# Patient Record
Sex: MALE | Race: WHITE | NOT HISPANIC OR LATINO | Employment: FULL TIME | RURAL
[De-identification: names, ages, dates, MRNs, and addresses within clinical notes are randomized per-mention and may not be internally consistent; named-entity substitution may affect disease eponyms.]

---

## 2018-12-07 ENCOUNTER — TELEPHONE (OUTPATIENT)
Dept: FAMILY MEDICINE CLINIC | Facility: CLINIC | Age: 43
End: 2018-12-07

## 2023-02-08 ENCOUNTER — TELEPHONE (OUTPATIENT)
Dept: NEUROSURGERY | Facility: CLINIC | Age: 48
End: 2023-02-08
Payer: OTHER MISCELLANEOUS

## 2023-02-08 NOTE — TELEPHONE ENCOUNTER
Called patient to remind him to bring his imaging CD and to make sure he rec'd his new patient paperwork.  He didn't answer so I left a VM.  I left my direct # in case he needed to get back to me with any questions.    JONATHAN MALDONADO [facility]  PHYSICIAN LEAD  DR JUSTINE MISTRY  [facility] [place] [facility]      IT IS OKAY FOR THE HUB TO DELIVER THIS INFORMATION TO THE PATIENT IF THEY RECEIVE THIS CALL BACK

## 2023-03-03 ENCOUNTER — OFFICE VISIT (OUTPATIENT)
Dept: NEUROSURGERY | Facility: CLINIC | Age: 48
End: 2023-03-03
Payer: OTHER MISCELLANEOUS

## 2023-03-03 VITALS — HEIGHT: 69 IN | BODY MASS INDEX: 28.29 KG/M2 | WEIGHT: 191 LBS

## 2023-03-03 DIAGNOSIS — G89.29 CHRONIC MIDLINE LOW BACK PAIN WITH BILATERAL SCIATICA: ICD-10-CM

## 2023-03-03 DIAGNOSIS — E66.3 OVERWEIGHT WITH BODY MASS INDEX (BMI) OF 28 TO 28.9 IN ADULT: ICD-10-CM

## 2023-03-03 DIAGNOSIS — R20.2 NUMBNESS AND TINGLING IN BOTH HANDS: ICD-10-CM

## 2023-03-03 DIAGNOSIS — M50.30 DEGENERATION OF CERVICAL INTERVERTEBRAL DISC: ICD-10-CM

## 2023-03-03 DIAGNOSIS — F17.200 CURRENT EVERY DAY SMOKER: ICD-10-CM

## 2023-03-03 DIAGNOSIS — M54.42 CHRONIC MIDLINE LOW BACK PAIN WITH BILATERAL SCIATICA: ICD-10-CM

## 2023-03-03 DIAGNOSIS — R20.0 NUMBNESS AND TINGLING IN BOTH HANDS: ICD-10-CM

## 2023-03-03 DIAGNOSIS — M54.6 PAIN IN THORACIC SPINE: ICD-10-CM

## 2023-03-03 DIAGNOSIS — M79.18 MYOFASCIAL PAIN SYNDROME: Primary | ICD-10-CM

## 2023-03-03 DIAGNOSIS — M54.41 CHRONIC MIDLINE LOW BACK PAIN WITH BILATERAL SCIATICA: ICD-10-CM

## 2023-03-03 DIAGNOSIS — M54.2 CERVICALGIA: ICD-10-CM

## 2023-03-03 PROBLEM — M54.50 CHRONIC MIDLINE LOW BACK PAIN: Status: ACTIVE | Noted: 2023-03-03

## 2023-03-03 PROCEDURE — 99204 OFFICE O/P NEW MOD 45 MIN: CPT | Performed by: NEUROLOGICAL SURGERY

## 2023-03-03 RX ORDER — MECLIZINE HYDROCHLORIDE 25 MG/1
25 TABLET ORAL EVERY 6 HOURS PRN
COMMUNITY

## 2023-03-03 RX ORDER — PAROXETINE 10 MG/1
TABLET, FILM COATED ORAL
COMMUNITY
Start: 2023-02-09

## 2023-03-03 RX ORDER — CLOTRIMAZOLE AND BETAMETHASONE DIPROPIONATE 10; .64 MG/G; MG/G
1 CREAM TOPICAL
COMMUNITY

## 2023-03-03 RX ORDER — ONDANSETRON 4 MG/1
4 TABLET, FILM COATED ORAL EVERY 8 HOURS PRN
COMMUNITY

## 2023-03-03 RX ORDER — TRAMADOL HYDROCHLORIDE 50 MG/1
TABLET ORAL
COMMUNITY
Start: 2023-02-03 | End: 2023-06-03

## 2023-03-03 RX ORDER — DULOXETIN HYDROCHLORIDE 30 MG/1
1 CAPSULE, DELAYED RELEASE ORAL DAILY
COMMUNITY
Start: 2023-02-21

## 2023-03-03 RX ORDER — LORATADINE 10 MG/1
10 CAPSULE, LIQUID FILLED ORAL
COMMUNITY

## 2023-03-03 RX ORDER — CYCLOBENZAPRINE HCL 10 MG
10 TABLET ORAL
COMMUNITY

## 2023-03-03 RX ORDER — CLONAZEPAM 0.5 MG/1
0.5 TABLET ORAL
COMMUNITY

## 2023-03-03 RX ORDER — BUPROPION HYDROCHLORIDE 150 MG/1
TABLET, EXTENDED RELEASE ORAL EVERY 12 HOURS SCHEDULED
COMMUNITY
Start: 2023-03-01

## 2023-03-03 RX ORDER — NAPROXEN 500 MG/1
TABLET ORAL
COMMUNITY
Start: 2023-03-02

## 2023-03-03 RX ORDER — TRIAMCINOLONE ACETONIDE 1 MG/G
1 CREAM TOPICAL
COMMUNITY
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
## 2023-03-03 NOTE — PATIENT INSTRUCTIONS
"PATIENT TO CONTINUE TO FOLLOW UP WITH HIS PRIMARY CARE PROVIDER FOR YEARLY PHYSICAL EXAMS TO ENSURE COMPLETE HEALTH MAINTENANCE      BMI for Adults  What is BMI?  Body mass index (BMI) is a number that is calculated from a person's weight and height. BMI can help estimate how much of a person's weight is composed of fat. BMI does not measure body fat directly. Rather, it is an alternative to procedures that directly measure body fat, which can be difficult and expensive.  BMI can help identify people who may be at higher risk for certain medical problems.  What are BMI measurements used for?  BMI is used as a screening tool to identify possible weight problems. It helps determine whether a person is obese, overweight, a healthy weight, or underweight.  BMI is useful for:  Identifying a weight problem that may be related to a medical condition or may increase the risk for medical problems.  Promoting changes, such as changes in diet and exercise, to help reach a healthy weight. BMI screening can be repeated to see if these changes are working.  How is BMI calculated?  BMI involves measuring your weight in relation to your height. Both height and weight are measured, and the BMI is calculated from those numbers. This can be done either in English ([place] or metric measurements. Note that charts and online BMI calculators are available to help you find your BMI quickly and easily without having to do these calculations yourself.  To calculate your BMI in English ([place] measurements:    Measure your weight in pounds (lb).  Multiply the number of pounds by 703.  For example, for a person who weighs 180 lb, multiply that number by 703, which equals 126,540.  Measure your height in inches. Then multiply that number by itself to get a measurement called \"inches squared.\"  For example, for a person who is 70 inches tall, the \"inches squared\" measurement is 70 inches x 70 inches, which equals 4,900 inches squared.  Divide the " "total from step 2 (number of lb x 703) by the total from step 3 (inches squared): 126,540 ÷ 4,900 = 25.8. This is your BMI.  To calculate your BMI in metric measurements:  Measure your weight in kilograms (kg).  Measure your height in meters (m). Then multiply that number by itself to get a measurement called \"meters squared.\"  For example, for a person who is 1.75 m tall, the \"meters squared\" measurement is 1.75 m x 1.75 m, which is equal to 3.1 meters squared.  Divide the number of kilograms (your weight) by the meters squared number. In this example: 70 ÷ 3.1 = 22.6. This is your BMI.  What do the results mean?  BMI charts are used to identify whether you are underweight, normal weight, overweight, or obese. The following guidelines will be used:  Underweight: BMI less than 18.5.  Normal weight: BMI between 18.5 and 24.9.  Overweight: BMI between 25 and 29.9.  Obese: BMI of 30 or above.  Keep these notes in mind:  Weight includes both fat and muscle, so someone with a muscular build, such as an athlete, may have a BMI that is higher than 24.9. In cases like these, BMI is not an accurate measure of body fat.  To determine if excess body fat is the cause of a BMI of 25 or higher, further assessments may need to be done by a health care provider.  BMI is usually interpreted in the same way for men and women.  Where to find more information  For more information about BMI, including tools to quickly calculate your BMI, go to these websites:  Centers for Disease Control and Prevention: www.cdc.gov  American Heart Association: www.heart.org  National Heart, Lung, and Blood [place]: www.nhlbi.nih.gov  Summary  Body mass index (BMI) is a number that is calculated from a person's weight and height.  BMI may help estimate how much of a person's weight is composed of fat. BMI can help identify those who may be at higher risk for certain medical problems.  BMI can be measured using English measurements or metric " "measurements.  BMI charts are used to identify whether you are underweight, normal weight, overweight, or obese.  This information is not intended to replace advice given to you by your health care provider. Make sure you discuss any questions you have with your health care provider.  Document Revised: 09/09/2020 Document Reviewed: 07/17/2020  [employer] Patient Education © 2022 [employer]  DASH Eating Plan  DASH stands for Dietary Approaches to Stop Hypertension. The DASH eating plan is a healthy eating plan that has been shown to:  Reduce high blood pressure (hypertension).  Reduce your risk for type 2 diabetes, heart disease, and stroke.  Help with weight loss.  What are tips for following this plan?  Reading food labels  Check food labels for the amount of salt (sodium) per serving. Choose foods with less than 5 percent of the Daily Value of sodium. Generally, foods with less than 300 milligrams (mg) of sodium per serving fit into this eating plan.  To find whole grains, look for the word \"whole\" as the first word in the ingredient list.  Shopping  Buy products labeled as \"low-sodium\" or \"no salt added.\"  Buy fresh foods. Avoid canned foods and pre-made or frozen meals.  Cooking  Avoid adding salt when cooking. Use salt-free seasonings or herbs instead of table salt or sea salt. Check with your health care provider or pharmacist before using salt substitutes.  Do not ba foods. Cook foods using healthy methods such as baking, boiling, grilling, roasting, and broiling instead.  Cook with heart-healthy oils, such as olive, canola, avocado, soybean, or sunflower oil.  Meal planning    Eat a balanced diet that includes:  4 or more servings of fruits and 4 or more servings of vegetables each day. Try to fill one-half of your plate with fruits and vegetables.  6-8 servings of whole grains each day.  Less than 6 oz (170 g) of lean meat, poultry, or fish each day. A 3-oz (85-g) serving of meat is about the same size as " a deck of cards. One egg equals 1 oz (28 g).  2-3 servings of low-fat dairy each day. One serving is 1 cup (237 mL).  1 serving of nuts, seeds, or beans 5 times each week.  2-3 servings of heart-healthy fats. Healthy fats called omega-3 fatty acids are found in foods such as walnuts, flaxseeds, fortified milks, and eggs. These fats are also found in cold-water fish, such as sardines, salmon, and mackerel.  Limit how much you eat of:  Canned or prepackaged foods.  Food that is high in trans fat, such as some fried foods.  Food that is high in saturated fat, such as fatty meat.  Desserts and other sweets, sugary drinks, and other foods with added sugar.  Full-fat dairy products.  Do not salt foods before eating.  Do not eat more than 4 egg yolks a week.  Try to eat at least 2 vegetarian meals a week.  Eat more home-cooked food and less restaurant, buffet, and fast food.  Lifestyle  When eating at a restaurant, ask that your food be prepared with less salt or no salt, if possible.  If you drink alcohol:  Limit how much you use to:  0-1 drink a day for women who are not pregnant.  0-2 drinks a day for men.  Be aware of how much alcohol is in your drink. In the [place] one drink equals one 12 oz bottle of beer (355 mL), one 5 oz glass of wine (148 mL), or one 1½ oz glass of hard liquor (44 mL).  General information  Avoid eating more than 2,300 mg of salt a day. If you have hypertension, you may need to reduce your sodium intake to 1,500 mg a day.  Work with your health care provider to maintain a healthy body weight or to lose weight. Ask what an ideal weight is for you.  Get at least 30 minutes of exercise that causes your heart to beat faster (aerobic exercise) most days of the week. Activities may include walking, swimming, or biking.  Work with your health care provider or dietitian to adjust your eating plan to your individual calorie needs.  What foods should I eat?  Fruits  All fresh, dried, or frozen fruit. Canned  fruit in natural juice (without added sugar).  Vegetables  Fresh or frozen vegetables (raw, steamed, roasted, or grilled). Low-sodium or reduced-sodium tomato and vegetable juice. Low-sodium or reduced-sodium tomato sauce and tomato paste. Low-sodium or reduced-sodium canned vegetables.  Grains  Whole-grain or whole-wheat bread. Whole-grain or whole-wheat pasta. Brown rice. Oatmeal. Quinoa. Bulgur. Whole-grain and low-sodium cereals. Annette bread. Low-fat, low-sodium crackers. Whole-wheat flour tortillas.  Meats and other proteins  Skinless chicken or turkey. Ground chicken or turkey. Pork with fat trimmed off. Fish and seafood. Egg whites. Dried beans, peas, or lentils. Unsalted nuts, nut butters, and seeds. Unsalted canned beans. Lean cuts of beef with fat trimmed off. Low-sodium, lean precooked or cured meat, such as sausages or meat loaves.  Dairy  Low-fat (1%) or fat-free (skim) milk. Reduced-fat, low-fat, or fat-free cheeses. Nonfat, low-sodium ricotta or cottage cheese. Low-fat or nonfat yogurt. Low-fat, low-sodium cheese.  Fats and oils  Soft margarine without trans fats. Vegetable oil. Reduced-fat, low-fat, or light mayonnaise and salad dressings (reduced-sodium). Canola, safflower, olive, avocado, soybean, and sunflower oils. Avocado.  Seasonings and condiments  Herbs. Spices. Seasoning mixes without salt.  Other foods  Unsalted popcorn and pretzels. Fat-free sweets.  The items listed above may not be a complete list of foods and beverages you can eat. Contact a dietitian for more information.  What foods should I avoid?  Fruits  Canned fruit in a light or heavy syrup. Fried fruit. Fruit in cream or butter sauce.  Vegetables  Creamed or fried vegetables. Vegetables in a cheese sauce. Regular canned vegetables (not low-sodium or reduced-sodium). Regular canned tomato sauce and paste (not low-sodium or reduced-sodium). Regular tomato and vegetable juice (not low-sodium or reduced-sodium). Pickles.  Olives.  Grains  Baked goods made with fat, such as croissants, muffins, or some breads. Dry pasta or rice meal packs.  Meats and other proteins  Fatty cuts of meat. Ribs. Fried meat. Riley. Bologna, salami, and other precooked or cured meats, such as sausages or meat loaves. Fat from the back of a pig (fatback). Bratwurst. Salted nuts and seeds. Canned beans with added salt. Canned or smoked fish. Whole eggs or egg yolks. Chicken or turkey with skin.  Dairy  Whole or 2% milk, cream, and half-and-half. Whole or full-fat cream cheese. Whole-fat or sweetened yogurt. Full-fat cheese. Nondairy creamers. Whipped toppings. Processed cheese and cheese spreads.  Fats and oils  Butter. Stick margarine. Lard. Shortening. Ghee. Riley fat. Tropical oils, such as coconut, palm kernel, or palm oil.  Seasonings and condiments  Onion salt, garlic salt, seasoned salt, table salt, and sea salt. [facility] Hospital[facility] sauce. Tartar sauce. Barbecue sauce. Teriyaki sauce. Soy sauce, including reduced-sodium. Steak sauce. Canned and packaged gravies. Fish sauce. Oyster sauce. Cocktail sauce. Store-bought horseradish. Ketchup. Mustard. Meat flavorings and tenderizers. Bouillon cubes. Hot sauces. Pre-made or packaged marinades. Pre-made or packaged taco seasonings. Relishes. Regular salad dressings.  Other foods  Salted popcorn and pretzels.  The items listed above may not be a complete list of foods and beverages you should avoid. Contact a dietitian for more information.  Where to find more information  National Heart, Lung, and Blood [place]: www.nhlbi.nih.gov  American Heart Association: www.heart.org  Academy of Nutrition and Dietetics: www.eatright.org  National Kidney Foundation: www.kidney.org  Summary  The DASH eating plan is a healthy eating plan that has been shown to reduce high blood pressure (hypertension). It may also reduce your risk for type 2 diabetes, heart disease, and stroke.  When on the DASH eating plan, aim to eat more  fresh fruits and vegetables, whole grains, lean proteins, low-fat dairy, and heart-healthy fats.  With the DASH eating plan, you should limit salt (sodium) intake to 2,300 mg a day. If you have hypertension, you may need to reduce your sodium intake to 1,500 mg a day.  Work with your health care provider or dietitian to adjust your eating plan to your individual calorie needs.  This information is not intended to replace advice given to you by your health care provider. Make sure you discuss any questions you have with your health care provider.  Document Revised: 11/20/2020 Document Reviewed: 11/20/2020  [employer] Patient Education © 2022 [employer]  Steps to Quit Smoking  Smoking tobacco is the leading cause of preventable death. It can affect almost every organ in the body. Smoking puts you and those around you at risk for developing many serious chronic diseases. Quitting smoking can be difficult, but it is one of the best things that you can do for your health. It is never too late to quit.  How do I get ready to quit?  When you decide to quit smoking, create a plan to help you succeed. Before you quit:  Pick a date to quit. Set a date within the next 2 weeks to give you time to prepare.  Write down the reasons why you are quitting. Keep this list in places where you will see it often.  Tell your family, friends, and co-workers that you are quitting. Support from your loved ones can make quitting easier.  Talk with your health care provider about your options for quitting smoking.  Find out what treatment options are covered by your health insurance.  Identify people, places, things, and activities that make you want to smoke (triggers). Avoid them.  What first steps can I take to quit smoking?  Throw away all cigarettes at home, at work, and in your car.  Throw away smoking accessories, such as ashtrays and lighters.  Clean your car. Make sure to empty the ashtray.  Clean your home, including curtains and  carpets.  What strategies can I use to quit smoking?  Talk with your health care provider about combining strategies, such as taking medicines while you are also receiving in-person counseling. Using these two strategies together makes you more likely to succeed in quitting than if you used either strategy on its own.  If you are pregnant or breastfeeding, talk with your health care provider about finding counseling or other support strategies to quit smoking. Do not take medicine to help you quit smoking unless your health care provider tells you to do so.  To quit smoking:  Quit right away  Quit smoking completely, instead of gradually reducing how much you smoke over a period of time. Research shows that stopping smoking right away is more successful than gradually quitting.  Attend in-person counseling to help you build problem-solving skills. You are more likely to succeed in quitting if you attend counseling sessions regularly. Even short sessions of 10 minutes can be effective.  Take medicine  You may take medicines to help you quit smoking. Some medicines require a prescription and some you can purchase over-the-counter. Medicines may have nicotine in them to replace the nicotine in cigarettes. Medicines may:  Help to stop cravings.  Help to relieve withdrawal symptoms.  Your health care provider may recommend:  Nicotine patches, gum, or lozenges.  Nicotine inhalers or sprays.  Non-nicotine medicine that is taken by mouth.  Find resources  Find resources and support systems that can help you to quit smoking and remain smoke-free after you quit. These resources are most helpful when you use them often. They include:  Online chats with a counselor.  Telephone quitlines.  Printed self-help materials.  Support groups or group counseling.  Text messaging programs.  Mobile phone apps or applications. Use apps that can help you stick to your quit plan by providing reminders, tips, and encouragement. There are many  free apps for mobile devices as well as websites. Examples include Quit Guide from the CDC and smokefree.gov  What things can I do to make it easier to quit?    Reach out to your family and friends for support and encouragement. Call telephone quitlines (9-895-QUIT-NOW), reach out to support groups, or work with a counselor for support.  Ask people who smoke to avoid smoking around you.  Avoid places that trigger you to smoke, such as bars, parties, or smoke-break areas at work.  Spend time with people who do not smoke.  Lessen the stress in your life. Stress can be a smoking trigger for some people. To lessen stress, try:  Exercising regularly.  Doing deep-breathing exercises.  Doing yoga.  Meditating.  Performing a body scan. This involves closing your eyes, scanning your body from head to toe, and noticing which parts of your body are particularly tense. Try to relax the muscles in those areas.  How will I feel when I quit smoking?  Day 1 to 3 weeks  Within the first 24 hours of quitting smoking, you may start to feel withdrawal symptoms. These symptoms are usually most noticeable 2-3 days after quitting, but they usually do not last for more than 2-3 weeks. You may experience these symptoms:  Mood swings.  Restlessness, anxiety, or irritability.  Trouble concentrating.  Dizziness.  Strong cravings for sugary foods and nicotine.  Mild weight gain.  Constipation.  Nausea.  Coughing or a sore throat.  Changes in how the medicines that you take for unrelated issues work in your body.  Depression.  Trouble sleeping (insomnia).  Week 3 and afterward  After the first 2-3 weeks of quitting, you may start to notice more positive results, such as:  Improved sense of smell and taste.  Decreased coughing and sore throat.  Slower heart rate.  Lower blood pressure.  Clearer skin.  The ability to breathe more easily.  Fewer sick days.  Quitting smoking can be very challenging. Do not get discouraged if you are not successful  the first time. Some people need to make many attempts to quit before they achieve long-term success. Do your best to stick to your quit plan, and talk with your health care provider if you have any questions or concerns.  Summary  Smoking tobacco is the leading cause of preventable death. Quitting smoking is one of the best things that you can do for your health.  When you decide to quit smoking, create a plan to help you succeed.  Quit smoking right away, not slowly over a period of time.  When you start quitting, seek help from your health care provider, family, or friends.  This information is not intended to replace advice given to you by your health care provider. Make sure you discuss any questions you have with your health care provider.  Document Revised: 08/26/2022 Document Reviewed: 03/07/2020  [employer] Patient Education © 2022 [employer]  Tobacco Use Disorder  Tobacco use disorder (TUD) occurs when a person craves, seeks, and uses tobacco, regardless of the consequences. This disorder can cause problems with mental and physical health. It can affect your ability to have healthy relationships, and it can keep you from meeting your responsibilities at work, home, or school.  Tobacco may be:  Smoked as a cigarette or cigar.  Inhaled using e-cigarettes.  Smoked in a pipe or hookah.  Chewed as smokeless tobacco.  Inhaled into the nostrils as snuff.  Tobacco products contain a dangerous chemical called nicotine, which is very addictive. Nicotine triggers hormones that make the body feel stimulated and works on areas of the brain that make you feel good. These effects can make it hard for people to quit nicotine.  Tobacco contains many other unsafe chemicals that can damage almost every organ in the body. Smoking tobacco also puts others in danger due to fire risk and possible health problems caused by breathing in secondhand smoke.  What are the signs or symptoms?  Symptoms of TUD may include:  Being unable  to slow down or stop your tobacco use.  Spending an abnormal amount of time getting or using tobacco.  Craving tobacco. Cravings may last for up to 6 months after quitting.  Tobacco use that:  Interferes with your work, school, or home life.  Interferes with your personal and social relationships.  Makes you give up activities that you once enjoyed or found important.  Using tobacco even though you know that it is:  Dangerous or bad for your health or someone else's health.  Causing problems in your life.  Needing more and more of the substance to get the same effect (developing tolerance).  Experiencing unpleasant symptoms if you do not use the substance (withdrawal). Withdrawal symptoms may include:  Depressed, anxious, or irritable mood.  Difficulty concentrating.  Increased appetite.  Restlessness or trouble sleeping.  Using the substance to avoid withdrawal.  How is this diagnosed?  This condition may be diagnosed based on:  Your current and past tobacco use. Your health care provider may ask questions about how your tobacco use affects your life.  A physical exam.  You may be diagnosed with TUD if you have at least two symptoms within a 12-month period.  How is this treated?  This condition is treated by stopping tobacco use. Many people are unable to quit on their own and need help. Treatment may include:  Nicotine replacement therapy (NRT). NRT provides nicotine without the other harmful chemicals in tobacco. NRT gradually lowers the dosage of nicotine in the body and reduces withdrawal symptoms. NRT is available as:  Over-the-counter gums, lozenges, and skin patches.  Prescription mouth inhalers and nasal sprays.  Medicine that acts on the brain to reduce cravings and withdrawal symptoms.  A type of talk therapy that examines your triggers for tobacco use, how to avoid them, and how to cope with cravings (behavioral therapy).  Hypnosis. This may help with withdrawal symptoms.  Joining a support group for  others coping with TUD.  The best treatment for TUD is usually a combination of medicine, talk therapy, and support groups. Recovery can be a long process. Many people start using tobacco again after stopping (relapse). If you relapse, it does not mean that treatment will not work.  Follow these instructions at home:  Lifestyle  Do not use any products that contain nicotine or tobacco, such as cigarettes and e-cigarettes.  Avoid things that trigger tobacco use as much as you can. Triggers include people and situations that usually cause you to use tobacco.  Avoid drinks that contain caffeine, including coffee. These may worsen some withdrawal symptoms.  Find ways to manage stress. Wanting to smoke may cause stress, and stress can make you want to smoke. Relaxation techniques such as deep breathing, meditation, and yoga may help.  Attend support groups as needed. These groups are an important part of long-term recovery for many people.  General instructions  Take over-the-counter and prescription medicines only as told by your health care provider.  Check with your health care provider before taking any new prescription or over-the-counter medicines.  Decide on a friend, family member, or smoking quit-line (such as 1-800-QUIT-NOW in the [place] that you can call or text when you feel the urge to smoke or when you need help coping with cravings.  Keep all follow-up visits as told by your health care provider and therapist. This is important.  Contact a health care provider if:  You are not able to take your medicines as prescribed.  Your symptoms get worse, even with treatment.  Summary  Tobacco use disorder (TUD) occurs when a person craves, seeks, and uses tobacco regardless of the consequences.  This condition may be diagnosed based on your current and past tobacco use and a physical exam.  Many people are unable to quit on their own and need help. Recovery can be a long process.  The most effective treatment for TUD  is usually a combination of medicine, talk therapy, and support groups.  This information is not intended to replace advice given to you by your health care provider. Make sure you discuss any questions you have with your health care provider.  Document Revised: 08/26/2022 Document Reviewed: 11/09/2021  [employer] Patient Education © 2022 [employer]

## 2023-03-07 ENCOUNTER — TELEPHONE (OUTPATIENT)
Dept: NEUROSURGERY | Facility: CLINIC | Age: 48
End: 2023-03-07
Payer: OTHER MISCELLANEOUS
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
## 2023-03-29 ENCOUNTER — TELEPHONE (OUTPATIENT)
Dept: NEUROSURGERY | Facility: CLINIC | Age: 48
End: 2023-03-29
Payer: OTHER MISCELLANEOUS
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
## 2023-03-30 NOTE — TELEPHONE ENCOUNTER
I called and left a VM letting the patient know Dr Mistry got called out for an emergency and it would be tomorrow before I would have an answer on the MMI paperwork.    JONATHAN MALDONADO [facility]  PHYSICIAN LEAD  DR JUSTINE MISTRY  [facility]     room air

## 2023-03-31 NOTE — TELEPHONE ENCOUNTER
I did receive a call from Rajinder and he would just like the paperwork updated to say MMI from neurosurgical standpoint and then he will see if that will get the injections approved for the patient.    JONATHAN MALDONADO [facility]  PHYSICIAN LEAD  DR JUSTINE MISTRY  [facility] [place] [facility]UROS[facility]

## 2023-03-31 NOTE — TELEPHONE ENCOUNTER
I spoke w/Dr Mistry about this and he said the MMI is 1 year from DOI.  I called and spoke w/patient and he is going to have his  contact us b/c they are saying if he is at MMI then he doesn't need injections.      JONATHAN MALDONADO [facility]  PHYSICIAN LEAD  DR JUSTINE MISTRY  [facility] [place] [facility]UROS[facility]GERY

## 2023-04-10 ENCOUNTER — TELEPHONE (OUTPATIENT)
Dept: NEUROSURGERY | Facility: CLINIC | Age: 48
End: 2023-04-10
Payer: OTHER MISCELLANEOUS

## 2023-04-10 NOTE — TELEPHONE ENCOUNTER
Caller: Jae Mederos    Relationship: Self    Best call back number: [phone]    Who are you requesting to speak with (clinical staff, provider,  specific staff member): CLINICAL STAFF (JONATHAN)    What was the call regarding: PATIENT IS SEEKING GUIDANCE/UPDATE ON COMMUNICATION WITH WC (JONATHAN).    Do you require a callback: YES, PLEASE CALL PATIENT TO ADVISE.

## 2023-04-10 NOTE — TELEPHONE ENCOUNTER
The paperwork for Dr Mistry to initial is in his folder.  He will be in clinic tomorrow.  Patient aware.    JONATHAN MALDONADO [facility]  PHYSICIAN LEAD  DR JUSTINE MISTRY  [facility] [place] [facility]OS[facility]